# Patient Record
Sex: FEMALE | Race: WHITE | NOT HISPANIC OR LATINO | Employment: FULL TIME | URBAN - METROPOLITAN AREA
[De-identification: names, ages, dates, MRNs, and addresses within clinical notes are randomized per-mention and may not be internally consistent; named-entity substitution may affect disease eponyms.]

---

## 2017-02-14 ENCOUNTER — HOSPITAL ENCOUNTER (OUTPATIENT)
Dept: OBGYN | Facility: CLINIC | Age: 27
Discharge: HOME | End: 2017-02-15
Attending: OBSTETRICS & GYNECOLOGY | Admitting: OBSTETRICS & GYNECOLOGY

## 2017-02-14 ASSESSMENT — MIFFLIN-ST. JEOR: SCORE: 1536.43

## 2017-02-16 ENCOUNTER — RECORDS - HEALTHEAST (OUTPATIENT)
Dept: LAB | Facility: CLINIC | Age: 27
End: 2017-02-16

## 2017-02-16 LAB
ALT SERPL W P-5'-P-CCNC: 8 U/L (ref 0–45)
AST SERPL W P-5'-P-CCNC: 18 U/L (ref 0–40)
CREAT SERPL-MCNC: 0.61 MG/DL (ref 0.6–1.1)
GFR SERPL CREATININE-BSD FRML MDRD: >60 ML/MIN/1.73M2

## 2017-02-17 ENCOUNTER — HOSPITAL ENCOUNTER (OUTPATIENT)
Dept: MEDSURG UNIT | Facility: CLINIC | Age: 27
Discharge: HOME | End: 2017-02-17
Attending: OBSTETRICS & GYNECOLOGY | Admitting: OBSTETRICS & GYNECOLOGY

## 2017-02-17 ENCOUNTER — RECORDS - HEALTHEAST (OUTPATIENT)
Dept: LAB | Facility: CLINIC | Age: 27
End: 2017-02-17

## 2017-02-17 LAB
ALT SERPL W P-5'-P-CCNC: 8 U/L (ref 0–45)
AST SERPL W P-5'-P-CCNC: 19 U/L (ref 0–40)
CREAT SERPL-MCNC: 0.6 MG/DL (ref 0.6–1.1)
GFR SERPL CREATININE-BSD FRML MDRD: >60 ML/MIN/1.73M2

## 2017-02-17 ASSESSMENT — MIFFLIN-ST. JEOR: SCORE: 1573.63

## 2017-02-21 ENCOUNTER — HOSPITAL ENCOUNTER (OUTPATIENT)
Dept: OBGYN | Facility: CLINIC | Age: 27
Discharge: HOME | End: 2017-02-21
Attending: OBSTETRICS & GYNECOLOGY | Admitting: OBSTETRICS & GYNECOLOGY

## 2017-02-24 ASSESSMENT — MIFFLIN-ST. JEOR: SCORE: 1572.72

## 2017-02-28 ENCOUNTER — HOSPITAL ENCOUNTER (OUTPATIENT)
Dept: OBGYN | Facility: CLINIC | Age: 27
Discharge: HOME | End: 2017-02-24
Attending: OBSTETRICS & GYNECOLOGY | Admitting: OBSTETRICS & GYNECOLOGY

## 2017-03-01 ENCOUNTER — ANESTHESIA - HEALTHEAST (OUTPATIENT)
Dept: OBGYN | Facility: CLINIC | Age: 27
End: 2017-03-01

## 2017-03-03 ENCOUNTER — HOME CARE/HOSPICE - HEALTHEAST (OUTPATIENT)
Dept: HOME HEALTH SERVICES | Facility: HOME HEALTH | Age: 27
End: 2017-03-03

## 2017-03-05 ENCOUNTER — HOME CARE/HOSPICE - HEALTHEAST (OUTPATIENT)
Dept: HOME HEALTH SERVICES | Facility: HOME HEALTH | Age: 27
End: 2017-03-05

## 2021-05-27 ENCOUNTER — RECORDS - HEALTHEAST (OUTPATIENT)
Dept: ADMINISTRATIVE | Facility: CLINIC | Age: 31
End: 2021-05-27

## 2021-05-29 ENCOUNTER — RECORDS - HEALTHEAST (OUTPATIENT)
Dept: ADMINISTRATIVE | Facility: CLINIC | Age: 31
End: 2021-05-29

## 2021-05-30 VITALS — BODY MASS INDEX: 37 KG/M2 | WEIGHT: 196 LBS | HEIGHT: 61 IN

## 2021-05-30 VITALS — HEIGHT: 61 IN | BODY MASS INDEX: 38.51 KG/M2 | WEIGHT: 204 LBS

## 2021-05-30 VITALS — WEIGHT: 204.2 LBS | BODY MASS INDEX: 38.55 KG/M2 | HEIGHT: 61 IN

## 2021-05-31 ENCOUNTER — RECORDS - HEALTHEAST (OUTPATIENT)
Dept: ADMINISTRATIVE | Facility: CLINIC | Age: 31
End: 2021-05-31

## 2021-06-08 NOTE — PROGRESS NOTES
Kena RODRIGUES RN spoke to Dr. Hillman and was given telephone orders for 1000ml LR IV bolus, UOB, wet prep, and gbs culture to be performed. Pt is 1/80/-1 with mejia of 9. Pt is c/o discomfort with ctx, 4/10 for pain but coping well, talking through contractions. MD OK to give pt vistaril for discomfort. Will update MD when results come in.

## 2021-06-08 NOTE — PROGRESS NOTES
Spoke to Dr. Rafy MD aware ROM (+) negative. Pt will be observed overnight. Ok per MD to perform q shift NST and allow pt to sleep. Pt will be observed overnight. Will reiterate to pt to call out if she feels her contractions are getting worse and will perform SVE if needed. Will update provider as needed.

## 2021-06-08 NOTE — PROGRESS NOTES
"Spoke to Dr. Rafy MD aware that pt cervix has not changed, but when pt got up she stated she felt \"something running down her leg\". ROM plus pending. Per MD if patient is intact, will keep her as an observation patient and give sleep meds. If ruptured, will admit. OK per MD to start gbs prophylaxis when pt is in active labor. Will update provider.   "

## 2021-06-08 NOTE — PROGRESS NOTES
Pt has slept for most of the night. States she had difficulty falling asleep but has been sleeping for a few hours this morning. States contractions not as strong as they were last night. Call placed to Dr. Weber to discharge without SVE, f/u in clinic within a week and to call the clinic if she wants a prescription for Vistaril sent to the pharmacy.     Jackeline Brantley

## 2021-06-08 NOTE — PROGRESS NOTES
RN spoke with MD and updated her to pt and fetal status.  Plan to continue observation another hour and if Vital Signs are stable, pt may be discharge. Rx will be sent electronically by MD to pt's pharmacy for pickup.  Pt to follow up in clinic at scheduled apt on Wednesday.

## 2021-06-08 NOTE — PROGRESS NOTES
Spoke to Dr. Rafy MD aware of test results. Pt currently receiving 2nd LR bolus. This RN will perform SVE at 2130 and update provider.

## 2021-06-09 NOTE — ANESTHESIA PROCEDURE NOTES
Epidural Block    Patient location during procedure: OB  Time Called: 3/1/2017 2:15 PM  Reason for Block:labor epidural  Staffing:  Performing  Anesthesiologist: ZEE CHAVEZ  Preanesthetic Checklist  Completed: patient identified, risks, benefits, and alternatives discussed, timeout performed, consent obtained, at patient's request, airway assessed, oxygen available, suction available, emergency drugs available and hand hygiene performed  Procedure  Patient position: sitting  Prep: ChloraPrep  Patient monitoring: continuous pulse oximetry, heart rate and blood pressure  Approach: midline  Location: L2-L3  Injection technique: RAFAEL saline  Number of Attempts:1  Needle  Needle type: Devan   Needle gauge: 18 G     Catheter in Space: 5  Assessment  Sensory level:  No complications

## 2021-06-09 NOTE — ANESTHESIA POSTPROCEDURE EVALUATION
Patient: Milady Hutchison  Labor epidural  Anesthesia type: regional    Patient location: Labor and Delivery  Last vitals:   Vitals:    03/02/17 0410   BP: 118/79   Pulse: 74   Resp: 18   Temp:    SpO2:      Post vital signs: stable  Level of consciousness: awake and responds to simple questions  Post-anesthesia pain: pain controlled  Post-anesthesia nausea and vomiting: no  Pulmonary: unassisted, return to baseline  Cardiovascular: stable and blood pressure at baseline  Hydration: adequate  Anesthetic events: no    QCDR Measures:  ASA# 11 - Kya-op Cardiac Arrest: ASA11B - Patient did NOT experience unanticipated cardiac arrest  ASA# 12 - Kya-op Mortality Rate: ASA12B - Patient did NOT die  ASA# 13 - PACU Re-Intubation Rate: NA - No ETT / LMA used for case  ASA# 10 - Composite Anes Safety: ASA10A - No serious adverse event  ASA# 38 - New Corneal Injury: ASA38A - No new exposure keratitis or corneal abrasion in PACU    Additional Notes:

## 2021-06-09 NOTE — ANESTHESIA PREPROCEDURE EVALUATION
Anesthesia Evaluation      Patient summary reviewed   No history of anesthetic complications     Airway   Mallampati: I  Neck ROM: full   Pulmonary - negative ROS and normal exam                          Cardiovascular - normal exam  (+) hypertension, ,      Neuro/Psych - negative ROS     Endo/Other - negative ROS      GI/Hepatic/Renal - negative ROS           Dental - normal exam                        Anesthesia Plan  Planned anesthetic: epidural  Anesthesia Labor Epidural Note    Medical and surgical history reviewed.  Patient examined.  Alternative, benefits, and risks of labor epidural discussed including the risks of epidural abscess and hematoma, temporary or permanent nerve injury, seizure, cardiopulmonary arrest, and  and maternal complications to include death.  All questions answered.  The patient agreed to proceed given the risk.  Procedural pause completed prior to the commencement of the procedure.    A/P: Patient is  a satisfactory candidate for epidural analgesia for labor.  Will follow.  ASA 2

## 2021-06-09 NOTE — PROGRESS NOTES
pt comes to unit with c/o contractions that have not subsided. Pt placed on EFM. SVE reveals no change from last Thursday, see flowsheet. Contractions palpate mild, soft between. Pt has elevated BP which she is taking po labetalol at home. No other s/sx preeclampsia. Dr Nelson called and updated regarding SVE, BP, assessment findings. Order taken to do no further labs, ok to DC pt to home, given 1 dose vistaril prior to DC.   Pt updated on plan of care and in agreement w/plan. Pt given dc instructions and had no further questions or concerns. She ambulated without difficulty upon dc from unit at 0518.

## 2021-06-15 PROBLEM — O99.213 OBESITY AFFECTING PREGNANCY IN THIRD TRIMESTER: Status: ACTIVE | Noted: 2017-03-01

## 2021-06-15 PROBLEM — O13.3 GESTATIONAL HYPERTENSION W/O SIGNIFICANT PROTEINURIA IN 3RD TRIMESTER: Status: ACTIVE | Noted: 2017-03-01

## 2021-07-14 PROBLEM — Z34.90 PREGNANT: Status: RESOLVED | Noted: 2017-03-01 | Resolved: 2017-03-01

## 2023-04-30 ENCOUNTER — HOSPITAL ENCOUNTER (EMERGENCY)
Facility: HOSPITAL | Age: 33
Discharge: HOME | End: 2023-04-30
Attending: EMERGENCY MEDICINE | Admitting: EMERGENCY MEDICINE
Payer: COMMERCIAL

## 2023-04-30 ENCOUNTER — APPOINTMENT (OUTPATIENT)
Dept: CT IMAGING | Facility: HOSPITAL | Age: 33
End: 2023-04-30
Attending: EMERGENCY MEDICINE
Payer: COMMERCIAL

## 2023-04-30 VITALS
HEIGHT: 62 IN | TEMPERATURE: 97.9 F | RESPIRATION RATE: 16 BRPM | HEART RATE: 92 BPM | BODY MASS INDEX: 41.37 KG/M2 | OXYGEN SATURATION: 98 % | WEIGHT: 224.8 LBS | DIASTOLIC BLOOD PRESSURE: 80 MMHG | SYSTOLIC BLOOD PRESSURE: 126 MMHG

## 2023-04-30 DIAGNOSIS — G44.019 EPISODIC CLUSTER HEADACHE, NOT INTRACTABLE: ICD-10-CM

## 2023-04-30 LAB
ALBUMIN SERPL BCG-MCNC: 4.1 G/DL (ref 3.5–5.2)
ALP SERPL-CCNC: 48 U/L (ref 35–104)
ALT SERPL W P-5'-P-CCNC: 30 U/L (ref 10–35)
ANION GAP SERPL CALCULATED.3IONS-SCNC: 9 MMOL/L (ref 7–15)
AST SERPL W P-5'-P-CCNC: 31 U/L (ref 10–35)
BASOPHILS # BLD AUTO: 0 10E3/UL (ref 0–0.2)
BASOPHILS NFR BLD AUTO: 0 %
BILIRUB SERPL-MCNC: 0.2 MG/DL
BUN SERPL-MCNC: 8.9 MG/DL (ref 6–20)
CALCIUM SERPL-MCNC: 9.2 MG/DL (ref 8.6–10)
CHLORIDE SERPL-SCNC: 105 MMOL/L (ref 98–107)
CREAT SERPL-MCNC: 0.67 MG/DL (ref 0.51–0.95)
DEPRECATED HCO3 PLAS-SCNC: 26 MMOL/L (ref 22–29)
EOSINOPHIL # BLD AUTO: 0.2 10E3/UL (ref 0–0.7)
EOSINOPHIL NFR BLD AUTO: 4 %
ERYTHROCYTE [DISTWIDTH] IN BLOOD BY AUTOMATED COUNT: 13.2 % (ref 10–15)
GFR SERPL CREATININE-BSD FRML MDRD: >90 ML/MIN/1.73M2
GLUCOSE SERPL-MCNC: 111 MG/DL (ref 70–99)
HCG SERPL QL: NEGATIVE
HCT VFR BLD AUTO: 43 % (ref 35–47)
HGB BLD-MCNC: 14.1 G/DL (ref 11.7–15.7)
IMM GRANULOCYTES # BLD: 0 10E3/UL
IMM GRANULOCYTES NFR BLD: 0 %
LYMPHOCYTES # BLD AUTO: 3.1 10E3/UL (ref 0.8–5.3)
LYMPHOCYTES NFR BLD AUTO: 62 %
MCH RBC QN AUTO: 28.3 PG (ref 26.5–33)
MCHC RBC AUTO-ENTMCNC: 32.8 G/DL (ref 31.5–36.5)
MCV RBC AUTO: 86 FL (ref 78–100)
MONOCYTES # BLD AUTO: 0.8 10E3/UL (ref 0–1.3)
MONOCYTES NFR BLD AUTO: 15 %
NEUTROPHILS # BLD AUTO: 1 10E3/UL (ref 1.6–8.3)
NEUTROPHILS NFR BLD AUTO: 19 %
NRBC # BLD AUTO: 0 10E3/UL
NRBC BLD AUTO-RTO: 0 /100
PLATELET # BLD AUTO: 222 10E3/UL (ref 150–450)
POTASSIUM SERPL-SCNC: 4.1 MMOL/L (ref 3.4–5.3)
PROT SERPL-MCNC: 6.9 G/DL (ref 6.4–8.3)
RBC # BLD AUTO: 4.98 10E6/UL (ref 3.8–5.2)
SODIUM SERPL-SCNC: 140 MMOL/L (ref 136–145)
WBC # BLD AUTO: 5.1 10E3/UL (ref 4–11)

## 2023-04-30 PROCEDURE — 80053 COMPREHEN METABOLIC PANEL: CPT | Performed by: EMERGENCY MEDICINE

## 2023-04-30 PROCEDURE — 96375 TX/PRO/DX INJ NEW DRUG ADDON: CPT | Mod: 59

## 2023-04-30 PROCEDURE — 250N000011 HC RX IP 250 OP 636: Performed by: EMERGENCY MEDICINE

## 2023-04-30 PROCEDURE — 70498 CT ANGIOGRAPHY NECK: CPT

## 2023-04-30 PROCEDURE — 70496 CT ANGIOGRAPHY HEAD: CPT

## 2023-04-30 PROCEDURE — 99285 EMERGENCY DEPT VISIT HI MDM: CPT | Mod: 25

## 2023-04-30 PROCEDURE — 96374 THER/PROPH/DIAG INJ IV PUSH: CPT | Mod: 59

## 2023-04-30 PROCEDURE — 36415 COLL VENOUS BLD VENIPUNCTURE: CPT | Performed by: EMERGENCY MEDICINE

## 2023-04-30 PROCEDURE — 85025 COMPLETE CBC W/AUTO DIFF WBC: CPT | Performed by: EMERGENCY MEDICINE

## 2023-04-30 PROCEDURE — 84703 CHORIONIC GONADOTROPIN ASSAY: CPT | Performed by: EMERGENCY MEDICINE

## 2023-04-30 RX ORDER — METOCLOPRAMIDE 5 MG/1
5 TABLET ORAL 3 TIMES DAILY PRN
Qty: 10 TABLET | Refills: 0 | Status: SHIPPED | OUTPATIENT
Start: 2023-04-30

## 2023-04-30 RX ORDER — DIPHENHYDRAMINE HYDROCHLORIDE 50 MG/ML
25 INJECTION INTRAMUSCULAR; INTRAVENOUS ONCE
Status: COMPLETED | OUTPATIENT
Start: 2023-04-30 | End: 2023-04-30

## 2023-04-30 RX ORDER — KETOROLAC TROMETHAMINE 15 MG/ML
15 INJECTION, SOLUTION INTRAMUSCULAR; INTRAVENOUS ONCE
Status: COMPLETED | OUTPATIENT
Start: 2023-04-30 | End: 2023-04-30

## 2023-04-30 RX ORDER — DIPHENHYDRAMINE HCL 25 MG
25 CAPSULE ORAL EVERY 6 HOURS PRN
Qty: 10 CAPSULE | Refills: 0 | Status: SHIPPED | OUTPATIENT
Start: 2023-04-30

## 2023-04-30 RX ORDER — METOCLOPRAMIDE HYDROCHLORIDE 5 MG/ML
5 INJECTION INTRAMUSCULAR; INTRAVENOUS ONCE
Status: COMPLETED | OUTPATIENT
Start: 2023-04-30 | End: 2023-04-30

## 2023-04-30 RX ORDER — IOPAMIDOL 755 MG/ML
75 INJECTION, SOLUTION INTRAVASCULAR ONCE
Status: COMPLETED | OUTPATIENT
Start: 2023-04-30 | End: 2023-04-30

## 2023-04-30 RX ADMIN — IOPAMIDOL 75 ML: 755 INJECTION, SOLUTION INTRAVENOUS at 22:26

## 2023-04-30 RX ADMIN — KETOROLAC TROMETHAMINE 15 MG: 15 INJECTION, SOLUTION INTRAMUSCULAR; INTRAVENOUS at 21:25

## 2023-04-30 RX ADMIN — DIPHENHYDRAMINE HYDROCHLORIDE 25 MG: 50 INJECTION, SOLUTION INTRAMUSCULAR; INTRAVENOUS at 21:25

## 2023-04-30 RX ADMIN — METOCLOPRAMIDE 5 MG: 5 INJECTION, SOLUTION INTRAMUSCULAR; INTRAVENOUS at 21:23

## 2023-04-30 ASSESSMENT — ENCOUNTER SYMPTOMS
RHINORRHEA: 0
HEADACHES: 1
VOMITING: 0

## 2023-04-30 ASSESSMENT — ACTIVITIES OF DAILY LIVING (ADL): ADLS_ACUITY_SCORE: 35

## 2023-05-01 NOTE — DISCHARGE INSTRUCTIONS
Recommend rest, good hydration and plenty of sleep tonight to help continue breaking the headache.  Recommend Reglan with a dose of Benadryl and over-the-counter Motrin if headache returns.

## 2023-05-01 NOTE — ED PROVIDER NOTES
NAME: Milady Hutchison  AGE: 32 year old female  YOB: 1990  MRN: [id number]  EVALUATION DATE & TIME: No admission date for patient encounter.    PCP: Jackeline Norton    ED PROVIDER: Derrick Sharp M.D.      Chief Complaint   Patient presents with     Headache     FINAL IMPRESSION:  1. Episodic cluster headache, not intractable      MEDICAL DECISION MAKIN:05 PM I met with the patient, obtained history, performed an initial exam, and discussed options and plan for diagnostics and treatment here in the ED.   Patient was clinically assessed and consented to treatment. After assessment, medical decision making and workup were discussed with the patient. The patient was agreeable to plan for testing, workup, and treatment.  Pertinent Labs & Imaging studies reviewed. (See chart for details)  11:00 PM I rechecked and updated the patient with results. Patient's headache is completely resolved.      Medical Decision Making    History:    Supplemental history from: Documented in chart, if applicable    External Record(s) reviewed: Documented in chart, if applicable.    Work Up:    Chart documentation includes differential considered and any EKGs or imaging independently interpreted by provider, where specified.    In additional to work up documented, I considered the following work up: Documented in chart, if applicable.    External consultation:    Discussion of management with another provider: Documented in chart, if applicable    Complicating factors:    Care impacted by chronic illness: N/A    Care affected by social determinants of health: N/A    Disposition considerations: Discharge. I prescribed additional prescription strength medication(s) as charted. See documentation for any additional details.    Milady Hutchison is a 32 year old female who presents with headache.   Differential diagnosis includes but not limited to cluster headache, migraine headache, intracranial hemorrhage, CSF leak,  vestibulitis.  Patient with right-sided headache with positional changes.  Patient's positional changes when sitting up or standing quickly would fit with a CSF leak if she had had a lumbar puncture or rhinorrhea.  She has not had any recent procedures such as epidural or lumbar puncture.  She is also not had any recent trauma or nosebleeds, facial injuries.  Neurologically patient is intact and I suspect possibly atypical migraine with these positional changes or possibly cluster headache.  Patient started on migraine cocktail with IV fluids and labs were sent.  Due to the onset while bearing down or exerting herself and being hypertensive here I would be more concerned about a possible subarachnoid or bleed.  Patient was sent for CTA of the head with no acute findings of bleeding or blood.  With negative CT scan patient reassured and headache was now completely gone.  I did prescribe her Reglan, Benadryl, and over-the-counter ibuprofen to take at home if headache comes back however patient will follow-up with primary doctor as well if further problems or return to the ER if any new issues.  Patient will be plan for discharge.    0 minutes of critical care time    MEDICATIONS GIVEN IN THE EMERGENCY:  Medications   ketorolac (TORADOL) injection 15 mg (15 mg Intravenous $Given 4/30/23 2125)   metoclopramide (REGLAN) injection 5 mg (5 mg Intravenous $Given 4/30/23 2123)   diphenhydrAMINE (BENADRYL) injection 25 mg (25 mg Intravenous $Given 4/30/23 2125)   iopamidol (ISOVUE-370) solution 75 mL (75 mLs Intravenous $Given 4/30/23 2226)       NEW PRESCRIPTIONS STARTED AT TODAY'S ER VISIT:  Discharge Medication List as of 4/30/2023 11:17 PM      START taking these medications    Details   diphenhydrAMINE (BENADRYL) 25 MG capsule Take 1 capsule (25 mg) by mouth every 6 hours as needed for other (with reglan.), Disp-10 capsule, R-0, Local Print      metoclopramide (REGLAN) 5 MG tablet Take 1 tablet (5 mg) by mouth 3 times  "daily as needed (headache, nausea), Disp-10 tablet, R-0, Local Print                =================================================================    HPI    Patient information was obtained from: Patient     Use of : N/A        Milady Hutchison is a 32 year old female with a past medical history of obesity, gestational hypertension, who presents with a headache.     Per chart review patient was seen on 4/28/23 at the [facility]. She presented for evaluation of a head to toe hyperemia and history right axilla infection now significant proved. Diagnosed with an allergic phenomena caused by her medication Bactrim. Patient told to stop medication and discharged home.     Patient reports yesterday she awoke with a left sided, piercing headache. She notes the headache is positional as it turns into a \"punching pain\" when she sits up and laying flat improves the pain. She recently stopped taking Bactrim as it caused an allergic reaction but she notes this has since cleared. Took ibuprofen today which provided mild relief. Of note, patient has had recent straining with using the restroom.     No recent falls or injury. No previous back or neck surgeries.     Denies rhinorrhea and vomiting.     REVIEW OF SYSTEMS   Review of Systems   HENT: Negative for rhinorrhea.    Gastrointestinal: Negative for vomiting.   Neurological: Positive for headaches.   All other systems reviewed and are negative.     PAST MEDICAL HISTORY:  No past medical history on file.    PAST SURGICAL HISTORY:  Past Surgical History:   Procedure Laterality Date     BIOPSY CERVICAL, LOCAL EXCISION, SINGLE/MULTIPLE      July 2015     DILATION AND CURETTAGE       WISDOM TOOTH EXTRACTION         CURRENT MEDICATIONS:    No current facility-administered medications for this encounter.    Current Outpatient Medications:      diphenhydrAMINE (BENADRYL) 25 MG capsule, Take 1 capsule (25 mg) by mouth every 6 hours as needed for other (with reglan.), " "Disp: 10 capsule, Rfl: 0     metoclopramide (REGLAN) 5 MG tablet, Take 1 tablet (5 mg) by mouth 3 times daily as needed (headache, nausea), Disp: 10 tablet, Rfl: 0    ALLERGIES:  Allergies   Allergen Reactions     Percocet [Oxycodone-Acetaminophen] Itching     Sulfamethoxazole-Trimethoprim Rash       FAMILY HISTORY:  No family history on file.    SOCIAL HISTORY:   Social History     Socioeconomic History     Marital status:    Tobacco Use     Smoking status: Former     Smokeless tobacco: Never   Substance and Sexual Activity     Alcohol use: No     Drug use: No     Sexual activity: Yes     Partners: Male       PHYSICAL EXAM:    Vitals: /80   Pulse 92   Temp 97.9  F (36.6  C) (Oral)   Resp 16   Ht 1.562 m (5' 1.5\")   Wt 102 kg (224 lb 12.8 oz)   SpO2 98%   BMI 41.79 kg/m     Physical Exam  Vitals and nursing note reviewed.   Constitutional:       General: She is not in acute distress.     Appearance: Normal appearance. She is normal weight. She is not ill-appearing, toxic-appearing or diaphoretic.   HENT:      Head: Normocephalic and atraumatic.      Nose: Nose normal.   Eyes:      Extraocular Movements: Extraocular movements intact.      Pupils: Pupils are equal, round, and reactive to light.   Cardiovascular:      Rate and Rhythm: Normal rate and regular rhythm.      Heart sounds: Normal heart sounds.   Pulmonary:      Effort: Pulmonary effort is normal. No respiratory distress.      Breath sounds: Normal breath sounds.   Musculoskeletal:         General: No tenderness or signs of injury. Normal range of motion.      Cervical back: Normal range of motion and neck supple. No rigidity or tenderness.   Skin:     General: Skin is warm and dry.      Findings: No erythema or rash.   Neurological:      Mental Status: She is alert and oriented to person, place, and time.      Cranial Nerves: No cranial nerve deficit.      Sensory: No sensory deficit.      Motor: No weakness.      Coordination: " Coordination normal.   Psychiatric:         Behavior: Behavior normal.           LAB:  All pertinent labs reviewed and interpreted.  Labs Ordered and Resulted from Time of ED Arrival to Time of ED Departure   COMPREHENSIVE METABOLIC PANEL - Abnormal       Result Value    Sodium 140      Potassium 4.1      Chloride 105      Carbon Dioxide (CO2) 26      Anion Gap 9      Urea Nitrogen 8.9      Creatinine 0.67      Calcium 9.2      Glucose 111 (*)     Alkaline Phosphatase 48      AST 31      ALT 30      Protein Total 6.9      Albumin 4.1      Bilirubin Total 0.2      GFR Estimate >90     CBC WITH PLATELETS AND DIFFERENTIAL - Abnormal    WBC Count 5.1      RBC Count 4.98      Hemoglobin 14.1      Hematocrit 43.0      MCV 86      MCH 28.3      MCHC 32.8      RDW 13.2      Platelet Count 222      % Neutrophils 19      % Lymphocytes 62      % Monocytes 15      % Eosinophils 4      % Basophils 0      % Immature Granulocytes 0      NRBCs per 100 WBC 0      Absolute Neutrophils 1.0 (*)     Absolute Lymphocytes 3.1      Absolute Monocytes 0.8      Absolute Eosinophils 0.2      Absolute Basophils 0.0      Absolute Immature Granulocytes 0.0      Absolute NRBCs 0.0     HCG QUALITATIVE PREGNANCY - Normal    hCG Serum Qualitative Negative         RADIOLOGY:  CTA Head Neck with Contrast   Final Result   IMPRESSION:    HEAD CT:   1.  Normal head CT.      HEAD CTA:    1.  Normal CTA [place].      NECK CTA:   1.  Normal neck CTA.        PROCEDURES:   Procedures       I, Rachel Cortes, am serving as a scribe to document services personally performed by Dr. Derrick Sharp  based on my observation and the provider's statements to me. I, Derrick Sharp MD attest that Rachel Cortes is acting in a scribe capacity, has observed my performance of the services and has documented them in accordance with my direction.      Derrick Sharp M.D.  Emergency Medicine  [facility]       [place]  Derrick Fabian MD  05/01/23 0136

## 2023-05-01 NOTE — ED TRIAGE NOTES
Patient arrives to triage from home with chief complaint of headache since 0700 yesterday.  Patient reports when she is lying down the pain is better, but as soon as she sits up or stands up the intensity is worsened and feels like a sharp stabbing and pulsing sensation.  Endorses dizziness with position changes.  Last took Ibuprofen around 1600.  Bilateral shoulders and neck discomfort as well.  Range of motion in neck and head intact.  Denies any falls or trauma.  Alert and oriented x4.

## 2023-05-14 ENCOUNTER — HEALTH MAINTENANCE LETTER (OUTPATIENT)
Age: 33
End: 2023-05-14

## 2025-02-16 ENCOUNTER — HEALTH MAINTENANCE LETTER (OUTPATIENT)
Age: 35
End: 2025-02-16